# Patient Record
Sex: MALE | Race: WHITE | HISPANIC OR LATINO | URBAN - METROPOLITAN AREA
[De-identification: names, ages, dates, MRNs, and addresses within clinical notes are randomized per-mention and may not be internally consistent; named-entity substitution may affect disease eponyms.]

---

## 2017-01-12 ENCOUNTER — FOLLOW UP ESTABLISHED (OUTPATIENT)
Dept: URBAN - METROPOLITAN AREA CLINIC 64 | Facility: CLINIC | Age: 57
End: 2017-01-12
Payer: COMMERCIAL

## 2017-01-12 DIAGNOSIS — H52.13 MYOPIA, BILATERAL: Primary | ICD-10-CM

## 2017-01-12 PROCEDURE — 92014 COMPRE OPH EXAM EST PT 1/>: CPT | Performed by: OPTOMETRIST

## 2017-01-12 ASSESSMENT — INTRAOCULAR PRESSURE
OD: 10
OS: 10

## 2017-01-12 ASSESSMENT — KERATOMETRY
OS: 62.56
OD: 57.08

## 2017-05-05 ENCOUNTER — FOLLOW UP ESTABLISHED (OUTPATIENT)
Dept: URBAN - METROPOLITAN AREA CLINIC 64 | Facility: CLINIC | Age: 57
End: 2017-05-05
Payer: COMMERCIAL

## 2017-05-05 PROCEDURE — 92081 LIMITED VISUAL FIELD XM: CPT | Performed by: OPTOMETRIST

## 2017-05-05 PROCEDURE — 92133 CPTRZD OPH DX IMG PST SGM ON: CPT | Performed by: OPTOMETRIST

## 2017-05-05 PROCEDURE — 92012 INTRM OPH EXAM EST PATIENT: CPT | Performed by: OPTOMETRIST

## 2018-02-02 ENCOUNTER — FOLLOW UP ESTABLISHED (OUTPATIENT)
Dept: URBAN - METROPOLITAN AREA CLINIC 64 | Facility: CLINIC | Age: 58
End: 2018-02-02
Payer: COMMERCIAL

## 2018-02-02 DIAGNOSIS — H25.13 AGE-RELATED NUCLEAR CATARACT, BILATERAL: ICD-10-CM

## 2018-02-02 PROCEDURE — 92012 INTRM OPH EXAM EST PATIENT: CPT | Performed by: OPTOMETRIST

## 2018-02-02 ASSESSMENT — INTRAOCULAR PRESSURE
OS: 23
OD: 15

## 2019-03-15 ENCOUNTER — FOLLOW UP ESTABLISHED (OUTPATIENT)
Dept: URBAN - METROPOLITAN AREA CLINIC 64 | Facility: CLINIC | Age: 59
End: 2019-03-15
Payer: COMMERCIAL

## 2019-03-15 PROCEDURE — 92012 INTRM OPH EXAM EST PATIENT: CPT | Performed by: OPTOMETRIST

## 2019-03-15 ASSESSMENT — INTRAOCULAR PRESSURE
OS: 17
OD: 18

## 2019-04-26 ENCOUNTER — FOLLOW UP ESTABLISHED (OUTPATIENT)
Dept: URBAN - METROPOLITAN AREA CLINIC 64 | Facility: CLINIC | Age: 59
End: 2019-04-26
Payer: COMMERCIAL

## 2019-04-26 DIAGNOSIS — H10.45 OTHER CHRONIC ALLERGIC CONJUNCTIVITIS: ICD-10-CM

## 2019-04-26 PROCEDURE — 92012 INTRM OPH EXAM EST PATIENT: CPT | Performed by: OPTOMETRIST

## 2019-04-26 RX ORDER — OLOPATADINE HYDROCHLORIDE 7 MG/ML
0.7 % SOLUTION OPHTHALMIC
Qty: 1 | Refills: 0 | Status: INACTIVE
Start: 2019-04-26 | End: 2020-06-19

## 2019-04-26 ASSESSMENT — INTRAOCULAR PRESSURE
OS: 12
OD: 13

## 2019-05-07 ENCOUNTER — FOLLOW UP ESTABLISHED (OUTPATIENT)
Dept: URBAN - METROPOLITAN AREA CLINIC 64 | Facility: CLINIC | Age: 59
End: 2019-05-07
Payer: COMMERCIAL

## 2019-05-07 PROCEDURE — 92012 INTRM OPH EXAM EST PATIENT: CPT | Performed by: OPTOMETRIST

## 2019-05-07 ASSESSMENT — INTRAOCULAR PRESSURE
OD: 14
OS: 14

## 2019-06-07 ENCOUNTER — FOLLOW UP ESTABLISHED (OUTPATIENT)
Dept: URBAN - METROPOLITAN AREA CLINIC 64 | Facility: CLINIC | Age: 59
End: 2019-06-07
Payer: COMMERCIAL

## 2019-06-07 PROCEDURE — 92012 INTRM OPH EXAM EST PATIENT: CPT | Performed by: OPTOMETRIST

## 2019-06-07 ASSESSMENT — INTRAOCULAR PRESSURE
OD: 17
OS: 17

## 2019-09-13 ENCOUNTER — FOLLOW UP ESTABLISHED (OUTPATIENT)
Dept: URBAN - METROPOLITAN AREA CLINIC 64 | Facility: CLINIC | Age: 59
End: 2019-09-13
Payer: COMMERCIAL

## 2019-09-13 PROCEDURE — 92012 INTRM OPH EXAM EST PATIENT: CPT | Performed by: OPTOMETRIST

## 2019-09-13 ASSESSMENT — INTRAOCULAR PRESSURE
OS: 18
OD: 18

## 2020-01-03 ENCOUNTER — FOLLOW UP ESTABLISHED (OUTPATIENT)
Dept: URBAN - METROPOLITAN AREA CLINIC 64 | Facility: CLINIC | Age: 60
End: 2020-01-03
Payer: COMMERCIAL

## 2020-01-03 PROCEDURE — 92012 INTRM OPH EXAM EST PATIENT: CPT | Performed by: OPTOMETRIST

## 2020-01-03 ASSESSMENT — INTRAOCULAR PRESSURE
OS: 19
OD: 17

## 2020-04-27 ENCOUNTER — FOLLOW UP ESTABLISHED (OUTPATIENT)
Dept: URBAN - METROPOLITAN AREA CLINIC 64 | Facility: CLINIC | Age: 60
End: 2020-04-27
Payer: COMMERCIAL

## 2020-04-27 DIAGNOSIS — H18.603 KERATOCONUS, UNSPECIFIED, BILATERAL: Primary | ICD-10-CM

## 2020-04-27 PROCEDURE — 92012 INTRM OPH EXAM EST PATIENT: CPT | Performed by: OPTOMETRIST

## 2020-06-05 ENCOUNTER — FOLLOW UP ESTABLISHED (OUTPATIENT)
Dept: URBAN - METROPOLITAN AREA CLINIC 64 | Facility: CLINIC | Age: 60
End: 2020-06-05
Payer: COMMERCIAL

## 2020-06-05 PROCEDURE — 92012 INTRM OPH EXAM EST PATIENT: CPT | Performed by: OPTOMETRIST

## 2020-06-05 RX ORDER — TIMOLOL MALEATE 5 MG/ML
0.5 % SOLUTION/ DROPS OPHTHALMIC
Qty: 3 | Refills: 0 | Status: INACTIVE
Start: 2020-06-05 | End: 2020-09-03

## 2020-06-05 ASSESSMENT — INTRAOCULAR PRESSURE
OS: 39
OD: 16

## 2020-06-19 ENCOUNTER — FOLLOW UP ESTABLISHED (OUTPATIENT)
Dept: URBAN - METROPOLITAN AREA CLINIC 64 | Facility: CLINIC | Age: 60
End: 2020-06-19
Payer: COMMERCIAL

## 2020-06-19 PROCEDURE — 92012 INTRM OPH EXAM EST PATIENT: CPT | Performed by: OPTOMETRIST

## 2020-07-16 ENCOUNTER — FOLLOW UP ESTABLISHED (OUTPATIENT)
Dept: URBAN - METROPOLITAN AREA CLINIC 64 | Facility: CLINIC | Age: 60
End: 2020-07-16
Payer: COMMERCIAL

## 2020-07-16 PROCEDURE — 92012 INTRM OPH EXAM EST PATIENT: CPT | Performed by: OPTOMETRIST

## 2020-08-10 ENCOUNTER — NEW PATIENT (OUTPATIENT)
Dept: URBAN - METROPOLITAN AREA CLINIC 10 | Facility: CLINIC | Age: 60
End: 2020-08-10
Payer: COMMERCIAL

## 2020-08-10 DIAGNOSIS — H40.1132 PRIMARY OPEN-ANGLE GLAUCOMA, BILATERAL, MODERATE STAGE: Primary | ICD-10-CM

## 2020-08-10 PROCEDURE — 99204 OFFICE O/P NEW MOD 45 MIN: CPT | Performed by: OPHTHALMOLOGY

## 2020-08-10 PROCEDURE — 76514 ECHO EXAM OF EYE THICKNESS: CPT | Performed by: OPHTHALMOLOGY

## 2020-08-10 PROCEDURE — 92250 FUNDUS PHOTOGRAPHY W/I&R: CPT | Performed by: OPHTHALMOLOGY

## 2020-08-10 PROCEDURE — 92083 EXTENDED VISUAL FIELD XM: CPT | Performed by: OPHTHALMOLOGY

## 2020-08-10 ASSESSMENT — INTRAOCULAR PRESSURE
OD: 24
OS: 17

## 2020-08-10 ASSESSMENT — VISUAL ACUITY
OD: 20/400
OS: 20/800

## 2020-10-08 ENCOUNTER — FOLLOW UP ESTABLISHED (OUTPATIENT)
Dept: URBAN - METROPOLITAN AREA CLINIC 64 | Facility: CLINIC | Age: 60
End: 2020-10-08
Payer: COMMERCIAL

## 2020-10-08 PROCEDURE — 92012 INTRM OPH EXAM EST PATIENT: CPT | Performed by: OPTOMETRIST

## 2020-10-08 ASSESSMENT — INTRAOCULAR PRESSURE
OD: 17
OS: 12

## 2021-01-14 ENCOUNTER — FOLLOW UP ESTABLISHED (OUTPATIENT)
Dept: URBAN - METROPOLITAN AREA CLINIC 64 | Facility: CLINIC | Age: 61
End: 2021-01-14
Payer: COMMERCIAL

## 2021-01-14 PROCEDURE — 99212 OFFICE O/P EST SF 10 MIN: CPT | Performed by: OPTOMETRIST

## 2021-01-14 ASSESSMENT — INTRAOCULAR PRESSURE
OD: 18
OS: 16

## 2021-04-01 ENCOUNTER — OFFICE VISIT (OUTPATIENT)
Dept: URBAN - METROPOLITAN AREA CLINIC 64 | Facility: CLINIC | Age: 61
End: 2021-04-01
Payer: COMMERCIAL

## 2021-04-01 PROCEDURE — 99213 OFFICE O/P EST LOW 20 MIN: CPT | Performed by: OPTOMETRIST

## 2021-04-01 RX ORDER — LOTEPREDNOL ETABONATE 5 MG/ML
0.5 % SUSPENSION/ DROPS OPHTHALMIC
Qty: 5 | Refills: 3 | Status: INACTIVE
Start: 2021-04-01 | End: 2021-10-11

## 2021-04-01 ASSESSMENT — INTRAOCULAR PRESSURE
OD: 18
OS: 18

## 2021-04-01 NOTE — IMPRESSION/PLAN
Impression: Primary open-angle glaucoma, bilateral, moderate stage: I33.1143. Good IOP on Latanoprost QHS OU, Timolol QAM OU, Lotemax QD OD.   
 Plan:

## 2021-08-12 ENCOUNTER — OFFICE VISIT (OUTPATIENT)
Dept: URBAN - METROPOLITAN AREA CLINIC 64 | Facility: CLINIC | Age: 61
End: 2021-08-12
Payer: COMMERCIAL

## 2021-08-12 DIAGNOSIS — H02.052 TRICHIASIS WITHOUT ENTROPION RIGHT LOWER EYELID: ICD-10-CM

## 2021-08-12 PROCEDURE — 99213 OFFICE O/P EST LOW 20 MIN: CPT | Performed by: OPTOMETRIST

## 2021-08-12 ASSESSMENT — INTRAOCULAR PRESSURE
OS: 12
OD: 17

## 2021-08-12 NOTE — IMPRESSION/PLAN
Impression: Bilateral keratoconus: H18.603. Plan: Discussed. OD Hx PKP now with blanka and extensive scaring. OS severe keratoconus, no Hx PKP. Consider seeing Dr. Hali Kohler.

## 2021-08-12 NOTE — IMPRESSION/PLAN
Impression: Primary open-angle glaucoma, moderate stage, bilateral Plan: Good IOP today on latanoprost OU, timolol OU, and lotemax OS only. Reports compliance with drops. Target mid to low teens. Will monitor q 3mo. HVF/RNFL difficult for patient due to keratoconus. Pt scheduled to see Dr. Silvia Yarbrough 11/29/21.

## 2021-11-29 ENCOUNTER — OFFICE VISIT (OUTPATIENT)
Dept: URBAN - METROPOLITAN AREA CLINIC 10 | Facility: CLINIC | Age: 61
End: 2021-11-29
Payer: COMMERCIAL

## 2021-11-29 PROCEDURE — 99214 OFFICE O/P EST MOD 30 MIN: CPT | Performed by: OPHTHALMOLOGY

## 2021-11-29 PROCEDURE — 92020 GONIOSCOPY: CPT | Performed by: OPHTHALMOLOGY

## 2021-11-29 ASSESSMENT — INTRAOCULAR PRESSURE
OS: 11
OD: 19

## 2021-11-29 NOTE — IMPRESSION/PLAN
Impression: Primary open-angle glaucoma, moderate stage, bilateral
(+) Brimonidine and Brinzolamide Allergy  
(+) hear valves and Congestive heart failure Plan: Pt has Glaucoma    Gonio :  Challenging 2/2 PKP Poor view OD  //SS Trace      [id number]     Today's IOP : 19/ 11   Tmax  : 24/39 Target IOP low to mid teens Pt denies Fhx of Glaucoma Right eye is the better seeing eye (Last vf  difficult for patient due to keratoconus. 11/29/21. C/D: 0.7x0.7 good rim  /0.6x0.6
OCT: Unable, Will follow anatomically Pt denies Sulfa Allergy   // Pt denies Lung DX Plan :
1. Continue
latanoprost OU
timolol OU
PF QD OD 2. high IOP OD however patient does use a maintenance dose of steroid. 3. Will follow patient with serial dilation 2/2 corneal haze causing poor view 4.  Patient to keep scheduled appointment with Dr. Woodall Sensing

## 2022-02-02 ENCOUNTER — OFFICE VISIT (OUTPATIENT)
Dept: URBAN - METROPOLITAN AREA CLINIC 64 | Facility: CLINIC | Age: 62
End: 2022-02-02
Payer: COMMERCIAL

## 2022-02-02 PROCEDURE — 99213 OFFICE O/P EST LOW 20 MIN: CPT | Performed by: OPTOMETRIST

## 2022-02-02 ASSESSMENT — INTRAOCULAR PRESSURE
OS: 15
OD: 21

## 2022-02-02 NOTE — IMPRESSION/PLAN
Impression: Primary open-angle glaucoma, moderate stage, bilateral Plan: IOP OD:21 OS:15. Target mid to low teens. Continue  latanoprost OU, timolol OU, and lotemax OD only h/o PKP. Reports compliance with drops. Discussed poor compliance could lead to blindness. HVF/RNFL difficult for patient due to keratoconus. Patient seen with glaucoma specialist  Dr. Obi Campbell 11/29/21 - consider Istent or  omni.

## 2022-02-02 NOTE — IMPRESSION/PLAN
Impression: Bilateral keratoconus: H18.603. Plan: Discussed. OD Hx PKP now with blanka and extensive scaring. OS severe keratoconus, having consult for PKP OS. See corneal specialist Dr. Charlie Vasquez as scheduled.

## 2022-03-09 ENCOUNTER — OFFICE VISIT (OUTPATIENT)
Dept: URBAN - METROPOLITAN AREA CLINIC 10 | Facility: CLINIC | Age: 62
End: 2022-03-09
Payer: COMMERCIAL

## 2022-03-09 DIAGNOSIS — T86.8411 CORNEAL TRANSPLANT FAILURE, RIGHT EYE: ICD-10-CM

## 2022-03-09 DIAGNOSIS — H25.813 COMBINED FORMS OF AGE-RELATED CATARACT, BILATERAL: ICD-10-CM

## 2022-03-09 PROCEDURE — 76514 ECHO EXAM OF EYE THICKNESS: CPT | Performed by: OPHTHALMOLOGY

## 2022-03-09 PROCEDURE — 99214 OFFICE O/P EST MOD 30 MIN: CPT | Performed by: OPHTHALMOLOGY

## 2022-03-09 PROCEDURE — 92025 CPTRIZED CORNEAL TOPOGRAPHY: CPT | Performed by: OPHTHALMOLOGY

## 2022-03-09 ASSESSMENT — VISUAL ACUITY
OD: 20/600
OS: 20/600-

## 2022-03-09 ASSESSMENT — INTRAOCULAR PRESSURE
OS: 18
OD: 18

## 2022-03-09 ASSESSMENT — KERATOMETRY
OS: 59.50
OD: 58.63

## 2022-03-09 NOTE — IMPRESSION/PLAN
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
- H/o PKP OD Plan: Per pt vision acceptable with specialty CTLs despite scarring of graft. 
Cont pred qd OD

## 2022-03-09 NOTE — IMPRESSION/PLAN
Impression: Keratoconus, unspecified, bilateral: H18.603. Plan: Severe steepening and scarring OS. I would recommend PKP surgery. Visual rehabilitation can take a year or longer. Risks of Keratoplasty include similar risks to any intraocular surgery such as bleeding, cataract, and infection. Risks include rejection, need for additional surgery, need for glasses after, and the risks from the medications used. Steroids should not be stopped without instruction by a doctor. Information packet given. Questions answered. Explained to pt that there is a risk of worsening glaucoma with corneal transplantation and steroid gtts. Schedule PKP OS. Will need to contact his PCP Dr. Tong Bellamy ([address]) and [facility] as pt has been on Warfarin for 17 years for artificial heart valve, will need to hold blood thinners for surgery.

## 2022-03-09 NOTE — IMPRESSION/PLAN
Impression: Primary open-angle glaucoma, moderate stage, bilateral
(+) Brimonidine and Brinzolamide Allergy  
(+) hear valves and Congestive heart failure Plan: Under the care of Dr. Laurence Vasquez. 1. Continue
latanoprost OU
timolol OU

## 2022-03-09 NOTE — IMPRESSION/PLAN
Impression: Combined forms of age-related cataract, bilateral: H25.813.  Plan: VS OU, will address after corneal issues addressed

## 2022-06-23 ENCOUNTER — OFFICE VISIT (OUTPATIENT)
Dept: URBAN - METROPOLITAN AREA CLINIC 24 | Facility: CLINIC | Age: 62
End: 2022-06-23
Payer: COMMERCIAL

## 2022-06-23 DIAGNOSIS — H25.813 COMBINED FORMS OF AGE-RELATED CATARACT, BILATERAL: ICD-10-CM

## 2022-06-23 DIAGNOSIS — H40.1132 PRIMARY OPEN-ANGLE GLAUCOMA, BILATERAL, MODERATE STAGE: Primary | ICD-10-CM

## 2022-06-23 DIAGNOSIS — H18.603 KERATOCONUS, UNSPECIFIED, BILATERAL: ICD-10-CM

## 2022-06-23 DIAGNOSIS — T86.8411 CORNEAL TRANSPLANT FAILURE, RIGHT EYE: ICD-10-CM

## 2022-06-23 PROCEDURE — 99213 OFFICE O/P EST LOW 20 MIN: CPT | Performed by: OPHTHALMOLOGY

## 2022-06-23 ASSESSMENT — INTRAOCULAR PRESSURE
OD: 16
OS: 11

## 2022-06-23 NOTE — IMPRESSION/PLAN
Impression: Keratoconus, unspecified, bilateral: H18.603.  Plan: Planning to have pkp os in spring next year with Dr. Issac Joseph

## 2022-06-23 NOTE — IMPRESSION/PLAN
Impression: Corneal transplant failure, right eye: Y85.1692.
- H/o PKP OD Plan: Per pt vision acceptable with specialty CTLs despite scarring of graft. 
Cont pred qd OD

## 2022-06-23 NOTE — IMPRESSION/PLAN
Impression: Primary open-angle glaucoma, moderate stage, bilateral
(+) Brimonidine and Brinzolamide Allergy  
(+) hear valves and Congestive heart failure Plan: Pt has Glaucoma    Gonio :  Challenging 2/2 PKP Poor view OD  //SS Trace      Pach: 509/502     Today's IOP : 16/11   Tmax  : 24/39 Target IOP low to mid teens Pt denies Fhx of Glaucoma Right eye is the better seeing eye Last vf  difficult for patient due to keratoconus. 11/29/21. C/D: 0.7x0.7 good rim  /0.6x0.6
OCT: Unable, Will follow anatomically Pt denies Sulfa Allergy   // Pt denies Lung DX Plan :
1. Continue
latanoprost QHS OU
timolol QAM OU
pred QD OD 2. Doing well today 3.  Return 6 months with DFE, RNFL and HVF with Dr. Derick Walker

## 2023-02-17 ENCOUNTER — OFFICE VISIT (OUTPATIENT)
Dept: URBAN - METROPOLITAN AREA CLINIC 30 | Facility: CLINIC | Age: 63
End: 2023-02-17
Payer: COMMERCIAL

## 2023-02-17 DIAGNOSIS — T86.8411 CORNEAL TRANSPLANT FAILURE, RIGHT EYE: ICD-10-CM

## 2023-02-17 DIAGNOSIS — H25.813 COMBINED FORMS OF AGE-RELATED CATARACT, BILATERAL: ICD-10-CM

## 2023-02-17 DIAGNOSIS — H40.1132 PRIMARY OPEN-ANGLE GLAUCOMA, BILATERAL, MODERATE STAGE: Primary | ICD-10-CM

## 2023-02-17 DIAGNOSIS — H18.603 KERATOCONUS, UNSPECIFIED, BILATERAL: ICD-10-CM

## 2023-02-17 PROCEDURE — 99214 OFFICE O/P EST MOD 30 MIN: CPT | Performed by: OPHTHALMOLOGY

## 2023-02-17 PROCEDURE — 92083 EXTENDED VISUAL FIELD XM: CPT | Performed by: OPHTHALMOLOGY

## 2023-02-17 ASSESSMENT — INTRAOCULAR PRESSURE
OS: 9
OD: 10

## 2023-02-17 NOTE — IMPRESSION/PLAN
Impression: Corneal transplant failure, right eye: D41.8529.
- H/o PKP OD Plan: Per pt vision acceptable with specialty CTLs despite scarring of graft. 
Cont pred qd OD

## 2023-02-17 NOTE — IMPRESSION/PLAN
Impression: Combined forms of age-related cataract, bilateral: H25.813. Plan: Will refer patient to Dr. Shane Bates for cataract surgery 2/2 corneal diagnosis. Pt corneal issues trump well controlled glaucoma for cataract surgery.   I am happy to help with [facility] if needed in conjunction (but not necessary at this time)

## 2023-02-17 NOTE — IMPRESSION/PLAN
Impression: Primary open-angle glaucoma, moderate stage, bilateral
(+) Brimonidine and Brinzolamide Allergy  
(+) hear valves and Congestive heart failure Plan: Pt has Glaucoma    Gonio :  Challenging 2/2 PKP Poor view OD  //SS Trace      Pach: 509/502     Today's IOP : 10/9  Tmax  : 24/39 Target IOP low to mid teens Pt denies Fhx of Glaucoma Right eye is the better seeing eye Last vf  difficult for patient due to keratoconus. 2/17/23 C/D: 0.5x0.5/ 0.7x0.7 OCT: Unable, Will follow anatomically Pt denies Sulfa Allergy   // Pt denies Lung DX Plan :
1. Continue
latanoprost QHS OU
timolol QAM OU
pred QD OD 2. Doing well today. Patient to refer to optometrist for medical management of Glaucoma. Patient to return to Dr. Yakov Amaya if changes in visual field or OCT.

## 2023-02-17 NOTE — IMPRESSION/PLAN
Impression: Keratoconus, unspecified, bilateral: H18.603.  Plan: Planning to have pkp os in spring next year with Dr. Mayra York

## 2024-04-12 ENCOUNTER — OFFICE VISIT (OUTPATIENT)
Dept: URBAN - METROPOLITAN AREA CLINIC 30 | Facility: CLINIC | Age: 64
End: 2024-04-12
Payer: COMMERCIAL

## 2024-04-12 DIAGNOSIS — H40.1132 PRIMARY OPEN-ANGLE GLAUCOMA, BILATERAL, MODERATE STAGE: Primary | ICD-10-CM

## 2024-04-12 DIAGNOSIS — H25.813 COMBINED FORMS OF AGE-RELATED CATARACT, BILATERAL: ICD-10-CM

## 2024-04-12 PROCEDURE — 92083 EXTENDED VISUAL FIELD XM: CPT | Performed by: OPHTHALMOLOGY

## 2024-04-12 PROCEDURE — 99214 OFFICE O/P EST MOD 30 MIN: CPT | Performed by: OPHTHALMOLOGY

## 2024-04-12 RX ORDER — LATANOPROST 50 UG/ML
0.005 % SOLUTION OPHTHALMIC
Qty: 7.5 | Refills: 3 | Status: ACTIVE
Start: 2024-04-12

## 2024-04-12 RX ORDER — LOTEPREDNOL ETABONATE 5 MG/ML
0.5 % SUSPENSION/ DROPS OPHTHALMIC
Qty: 5 | Refills: 3 | Status: ACTIVE
Start: 2024-04-12

## 2024-04-12 RX ORDER — TIMOLOL MALEATE 5 MG/ML
0.5 % SOLUTION/ DROPS OPHTHALMIC
Qty: 15 | Refills: 3 | Status: ACTIVE
Start: 2024-04-12

## 2024-04-12 ASSESSMENT — INTRAOCULAR PRESSURE
OD: 15
OS: 14

## 2024-08-08 ENCOUNTER — OFFICE VISIT (OUTPATIENT)
Dept: URBAN - METROPOLITAN AREA CLINIC 64 | Facility: LOCATION | Age: 64
End: 2024-08-08
Payer: COMMERCIAL

## 2024-08-08 DIAGNOSIS — H18.602 KERATOCONUS, UNSPECIFIED, LEFT EYE: ICD-10-CM

## 2024-08-08 DIAGNOSIS — H40.1132 PRIMARY OPEN-ANGLE GLAUCOMA, BILATERAL, MODERATE STAGE: Primary | ICD-10-CM

## 2024-08-08 PROCEDURE — 99214 OFFICE O/P EST MOD 30 MIN: CPT

## 2024-08-08 RX ORDER — NETARSUDIL AND LATANOPROST OPHTHALMIC SOLUTION, 0.02%/0.005% .2; .05 MG/ML; MG/ML
SOLUTION/ DROPS OPHTHALMIC; TOPICAL
Qty: 5 | Refills: 3 | Status: ACTIVE
Start: 2024-08-08

## 2024-08-08 ASSESSMENT — INTRAOCULAR PRESSURE
OS: 19
OD: 23

## 2024-10-24 ENCOUNTER — OFFICE VISIT (OUTPATIENT)
Dept: URBAN - METROPOLITAN AREA CLINIC 64 | Facility: LOCATION | Age: 64
End: 2024-10-24
Payer: COMMERCIAL

## 2024-10-24 DIAGNOSIS — H18.602 KERATOCONUS, UNSPECIFIED, LEFT EYE: ICD-10-CM

## 2024-10-24 DIAGNOSIS — H40.1132 PRIMARY OPEN-ANGLE GLAUCOMA, BILATERAL, MODERATE STAGE: Primary | ICD-10-CM

## 2024-10-24 PROCEDURE — 92133 CPTRZD OPH DX IMG PST SGM ON: CPT | Performed by: OPTOMETRIST

## 2024-10-24 PROCEDURE — 92083 EXTENDED VISUAL FIELD XM: CPT | Performed by: OPTOMETRIST

## 2024-10-24 PROCEDURE — 99214 OFFICE O/P EST MOD 30 MIN: CPT | Performed by: OPTOMETRIST

## 2024-10-24 ASSESSMENT — INTRAOCULAR PRESSURE
OD: 20
OS: 17
OD: 17
OS: 22

## 2025-01-23 ENCOUNTER — OFFICE VISIT (OUTPATIENT)
Dept: URBAN - METROPOLITAN AREA CLINIC 64 | Facility: LOCATION | Age: 65
End: 2025-01-23
Payer: COMMERCIAL

## 2025-01-23 DIAGNOSIS — H25.813 COMBINED FORMS OF AGE-RELATED CATARACT, BILATERAL: ICD-10-CM

## 2025-01-23 DIAGNOSIS — H40.1132 PRIMARY OPEN-ANGLE GLAUCOMA, BILATERAL, MODERATE STAGE: Primary | ICD-10-CM

## 2025-01-23 DIAGNOSIS — H18.602 KERATOCONUS, UNSPECIFIED, LEFT EYE: ICD-10-CM

## 2025-01-23 PROCEDURE — 99213 OFFICE O/P EST LOW 20 MIN: CPT | Performed by: OPTOMETRIST

## 2025-01-23 ASSESSMENT — INTRAOCULAR PRESSURE
OS: 18
OD: 22

## 2025-01-27 ENCOUNTER — OFFICE VISIT (OUTPATIENT)
Dept: URBAN - METROPOLITAN AREA CLINIC 64 | Facility: LOCATION | Age: 65
End: 2025-01-27
Payer: COMMERCIAL

## 2025-01-27 DIAGNOSIS — H25.813 COMBINED FORMS OF AGE-RELATED CATARACT, BILATERAL: ICD-10-CM

## 2025-01-27 DIAGNOSIS — H18.602 KERATOCONUS, UNSPECIFIED, LEFT EYE: ICD-10-CM

## 2025-01-27 DIAGNOSIS — H40.1132 PRIMARY OPEN-ANGLE GLAUCOMA, BILATERAL, MODERATE STAGE: Primary | ICD-10-CM

## 2025-01-27 DIAGNOSIS — H25.811 COMBINED FORMS OF AGE-RELATED CATARACT, RIGHT EYE: ICD-10-CM

## 2025-01-27 PROCEDURE — 99214 OFFICE O/P EST MOD 30 MIN: CPT | Performed by: OPHTHALMOLOGY

## 2025-01-27 RX ORDER — KETOROLAC TROMETHAMINE 5 MG/ML
0.5 % SOLUTION OPHTHALMIC
Qty: 5 | Refills: 1 | Status: ACTIVE
Start: 2025-01-27

## 2025-01-27 ASSESSMENT — INTRAOCULAR PRESSURE
OS: 20
OD: 22

## 2025-02-26 DIAGNOSIS — H25.813 COMBINED FORMS OF AGE-RELATED CATARACT, BILATERAL: ICD-10-CM

## 2025-02-26 DIAGNOSIS — H40.1132 PRIMARY OPEN-ANGLE GLAUCOMA, BILATERAL, MODERATE STAGE: ICD-10-CM

## 2025-02-26 DIAGNOSIS — Z01.818 ENCOUNTER FOR OTHER PREPROCEDURAL EXAMINATION: Primary | ICD-10-CM

## 2025-02-26 PROCEDURE — 99203 OFFICE O/P NEW LOW 30 MIN: CPT | Performed by: PHYSICIAN ASSISTANT

## 2025-03-18 ENCOUNTER — SURGERY (OUTPATIENT)
Dept: URBAN - METROPOLITAN AREA SURGERY 42 | Facility: LOCATION | Age: 65
End: 2025-03-18
Payer: COMMERCIAL

## 2025-03-18 PROCEDURE — 66991 XCAPSL CTRC RMVL INSJ 1+: CPT | Performed by: OPHTHALMOLOGY

## 2025-03-19 ENCOUNTER — POST-OPERATIVE VISIT (OUTPATIENT)
Dept: URBAN - METROPOLITAN AREA CLINIC 64 | Facility: LOCATION | Age: 65
End: 2025-03-19
Payer: COMMERCIAL

## 2025-03-19 DIAGNOSIS — Z48.810 ENCOUNTER FOR SURGICAL AFTERCARE FOLLOWING SURGERY ON A SENSE ORGAN: Primary | ICD-10-CM

## 2025-03-19 PROCEDURE — 99024 POSTOP FOLLOW-UP VISIT: CPT

## 2025-03-19 ASSESSMENT — INTRAOCULAR PRESSURE: OS: 14

## 2025-03-25 ENCOUNTER — POST-OPERATIVE VISIT (OUTPATIENT)
Dept: URBAN - METROPOLITAN AREA CLINIC 64 | Facility: LOCATION | Age: 65
End: 2025-03-25
Payer: COMMERCIAL

## 2025-03-25 DIAGNOSIS — Z48.810 ENCOUNTER FOR SURGICAL AFTERCARE FOLLOWING SURGERY ON A SENSE ORGAN: Primary | ICD-10-CM

## 2025-03-25 DIAGNOSIS — H52.4 PRESBYOPIA: ICD-10-CM

## 2025-03-25 PROCEDURE — 99024 POSTOP FOLLOW-UP VISIT: CPT | Performed by: OPTOMETRIST

## 2025-03-25 PROCEDURE — 92015 DETERMINE REFRACTIVE STATE: CPT | Performed by: OPTOMETRIST

## 2025-03-25 ASSESSMENT — INTRAOCULAR PRESSURE
OD: 11
OS: 12

## 2025-04-01 ENCOUNTER — SURGERY (OUTPATIENT)
Dept: URBAN - METROPOLITAN AREA SURGERY 42 | Facility: LOCATION | Age: 65
End: 2025-04-01
Payer: COMMERCIAL

## 2025-04-01 PROCEDURE — 66991 XCAPSL CTRC RMVL INSJ 1+: CPT | Performed by: OPHTHALMOLOGY

## 2025-04-01 RX ORDER — MOXIFLOXACIN HYDROCHLORIDE 5 MG/ML
0.5 % SOLUTION/ DROPS OPHTHALMIC
Qty: 5 | Refills: 1 | Status: ACTIVE
Start: 2025-04-01

## 2025-04-01 RX ORDER — PREDNISOLONE ACETATE 10 MG/ML
1 % SUSPENSION/ DROPS OPHTHALMIC
Qty: 10 | Refills: 1 | Status: ACTIVE
Start: 2025-04-01

## 2025-04-02 ENCOUNTER — POST-OPERATIVE VISIT (OUTPATIENT)
Dept: URBAN - METROPOLITAN AREA CLINIC 64 | Facility: LOCATION | Age: 65
End: 2025-04-02
Payer: COMMERCIAL

## 2025-04-02 DIAGNOSIS — Z96.1 PRESENCE OF INTRAOCULAR LENS: Primary | ICD-10-CM

## 2025-04-02 PROCEDURE — 99024 POSTOP FOLLOW-UP VISIT: CPT | Performed by: OPTOMETRIST

## 2025-04-02 ASSESSMENT — INTRAOCULAR PRESSURE: OD: 13

## 2025-04-08 ENCOUNTER — POST-OPERATIVE VISIT (OUTPATIENT)
Dept: URBAN - METROPOLITAN AREA CLINIC 64 | Facility: LOCATION | Age: 65
End: 2025-04-08

## 2025-04-08 DIAGNOSIS — Z96.1 PRESENCE OF INTRAOCULAR LENS: Primary | ICD-10-CM

## 2025-04-08 PROCEDURE — 99024 POSTOP FOLLOW-UP VISIT: CPT | Performed by: OPTOMETRIST

## 2025-04-08 ASSESSMENT — INTRAOCULAR PRESSURE
OS: 14
OD: 10

## 2025-04-29 ENCOUNTER — POST-OPERATIVE VISIT (OUTPATIENT)
Dept: URBAN - METROPOLITAN AREA CLINIC 64 | Facility: LOCATION | Age: 65
End: 2025-04-29
Payer: COMMERCIAL

## 2025-04-29 DIAGNOSIS — Z96.1 PRESENCE OF INTRAOCULAR LENS: Primary | ICD-10-CM

## 2025-04-29 PROCEDURE — 99024 POSTOP FOLLOW-UP VISIT: CPT | Performed by: OPTOMETRIST

## 2025-04-29 ASSESSMENT — INTRAOCULAR PRESSURE
OS: 11
OD: 14

## 2025-08-26 ENCOUNTER — OFFICE VISIT (OUTPATIENT)
Dept: URBAN - METROPOLITAN AREA CLINIC 64 | Facility: LOCATION | Age: 65
End: 2025-08-26
Payer: COMMERCIAL

## 2025-08-26 DIAGNOSIS — H26.493 OTHER SECONDARY CATARACT, BILATERAL: Primary | ICD-10-CM

## 2025-08-26 DIAGNOSIS — Z96.1 PRESENCE OF INTRAOCULAR LENS: ICD-10-CM

## 2025-08-26 PROCEDURE — 99213 OFFICE O/P EST LOW 20 MIN: CPT | Performed by: OPTOMETRIST

## 2025-08-26 ASSESSMENT — INTRAOCULAR PRESSURE
OD: 13
OS: 16